# Patient Record
Sex: MALE | Race: WHITE | NOT HISPANIC OR LATINO
[De-identification: names, ages, dates, MRNs, and addresses within clinical notes are randomized per-mention and may not be internally consistent; named-entity substitution may affect disease eponyms.]

---

## 2020-02-25 ENCOUNTER — HOSPITAL (OUTPATIENT)
Dept: OTHER | Age: 50
End: 2020-02-25
Attending: ORTHOPAEDIC SURGERY

## 2020-02-27 ENCOUNTER — HOSPITAL (OUTPATIENT)
Dept: OTHER | Age: 50
End: 2020-02-27
Attending: ORTHOPAEDIC SURGERY

## 2020-10-02 ENCOUNTER — HOSPITAL (OUTPATIENT)
Dept: OTHER | Age: 50
End: 2020-10-02
Attending: PHYSICIAN ASSISTANT

## 2020-11-14 ENCOUNTER — HOSPITAL (OUTPATIENT)
Dept: OTHER | Age: 50
End: 2020-11-14

## 2020-11-17 LAB
SARS-COV-2 RNA RESP QL NAA+PROBE: NOT DETECTED
SPECIMEN SOURCE: NORMAL

## 2021-02-09 ENCOUNTER — TELEPHONE (OUTPATIENT)
Dept: MRI IMAGING | Age: 51
End: 2021-02-09

## 2021-02-11 ENCOUNTER — TELEPHONE (OUTPATIENT)
Dept: CT IMAGING | Age: 51
End: 2021-02-11

## 2021-02-15 ENCOUNTER — TELEPHONE (OUTPATIENT)
Dept: PREADMISSION TESTING | Age: 51
End: 2021-02-15

## 2021-02-15 VITALS — HEIGHT: 75 IN | WEIGHT: 290 LBS | BODY MASS INDEX: 36.06 KG/M2

## 2021-02-15 DIAGNOSIS — Z01.812 PRE-PROCEDURAL LABORATORY EXAMINATION: Primary | ICD-10-CM

## 2021-02-15 ASSESSMENT — ACTIVITIES OF DAILY LIVING (ADL)
HISTORY OF FALLING IN THE LAST YEAR (PRIOR TO ADMISSION): NO
ADL_BEFORE_ADMISSION: INDEPENDENT
ADL_SCORE: 12
NEEDS_ASSIST: NO

## 2021-02-15 ASSESSMENT — COGNITIVE AND FUNCTIONAL STATUS - GENERAL
ARE YOU BLIND OR DO YOU HAVE SERIOUS DIFFICULTY SEEING, EVEN WHEN WEARING GLASSES: NO
ARE YOU DEAF OR DO YOU HAVE SERIOUS DIFFICULTY  HEARING: NO

## 2021-02-17 ENCOUNTER — LAB SERVICES (OUTPATIENT)
Dept: LAB | Age: 51
End: 2021-02-17

## 2021-02-17 DIAGNOSIS — Z01.812 PRE-PROCEDURAL LABORATORY EXAMINATION: ICD-10-CM

## 2021-02-17 PROCEDURE — U0003 INFECTIOUS AGENT DETECTION BY NUCLEIC ACID (DNA OR RNA); SEVERE ACUTE RESPIRATORY SYNDROME CORONAVIRUS 2 (SARS-COV-2) (CORONAVIRUS DISEASE [COVID-19]), AMPLIFIED PROBE TECHNIQUE, MAKING USE OF HIGH THROUGHPUT TECHNOLOGIES AS DESCRIBED BY CMS-2020-01-R: HCPCS

## 2021-02-18 ENCOUNTER — ANESTHESIA EVENT (OUTPATIENT)
Dept: MRI IMAGING | Age: 51
End: 2021-02-18

## 2021-02-18 LAB
SARS-COV-2 RNA RESP QL NAA+PROBE: NOT DETECTED
SERVICE CMNT-IMP: NORMAL
SERVICE CMNT-IMP: NORMAL

## 2021-02-19 ENCOUNTER — ANESTHESIA (OUTPATIENT)
Dept: MRI IMAGING | Age: 51
End: 2021-02-19

## 2021-02-19 ENCOUNTER — HOSPITAL ENCOUNTER (OUTPATIENT)
Dept: MRI IMAGING | Age: 51
Discharge: HOME | End: 2021-02-19

## 2021-02-19 ENCOUNTER — HOSPITAL ENCOUNTER (OUTPATIENT)
Dept: GENERAL RADIOLOGY | Age: 51
Discharge: HOME | End: 2021-02-19
Attending: NURSE ANESTHETIST, CERTIFIED REGISTERED

## 2021-02-19 VITALS
WEIGHT: 297.62 LBS | RESPIRATION RATE: 13 BRPM | DIASTOLIC BLOOD PRESSURE: 100 MMHG | SYSTOLIC BLOOD PRESSURE: 144 MMHG | HEART RATE: 97 BPM | HEIGHT: 75 IN | BODY MASS INDEX: 37.01 KG/M2 | TEMPERATURE: 97.6 F | OXYGEN SATURATION: 94 %

## 2021-02-19 DIAGNOSIS — M79.602 PAIN, ARM, LEFT: ICD-10-CM

## 2021-02-19 DIAGNOSIS — R20.2 PARESTHESIA AND PAIN OF BOTH UPPER EXTREMITIES: ICD-10-CM

## 2021-02-19 DIAGNOSIS — M79.602 PARESTHESIA AND PAIN OF BOTH UPPER EXTREMITIES: ICD-10-CM

## 2021-02-19 DIAGNOSIS — R20.2 PARESTHESIA OF BOTH LOWER EXTREMITIES: ICD-10-CM

## 2021-02-19 DIAGNOSIS — M79.601 PARESTHESIA AND PAIN OF BOTH UPPER EXTREMITIES: ICD-10-CM

## 2021-02-19 PROCEDURE — 10002801 HB RX 250 W/O HCPCS: Performed by: NURSE ANESTHETIST, CERTIFIED REGISTERED

## 2021-02-19 PROCEDURE — 71045 X-RAY EXAM CHEST 1 VIEW: CPT

## 2021-02-19 PROCEDURE — 70551 MRI BRAIN STEM W/O DYE: CPT

## 2021-02-19 PROCEDURE — 13000008 HB ANESTHESIA MAC OUTSIDE OR

## 2021-02-19 PROCEDURE — 10002800 HB RX 250 W HCPCS: Performed by: NURSE ANESTHETIST, CERTIFIED REGISTERED

## 2021-02-19 PROCEDURE — 13000001 HB PHASE II RECOVERY EA 30 MINUTES

## 2021-02-19 PROCEDURE — 10004651 HB RX, NO CHARGE ITEM: Performed by: ANESTHESIOLOGY

## 2021-02-19 PROCEDURE — 10002807 HB RX 258: Performed by: REGISTERED NURSE

## 2021-02-19 RX ORDER — MIDAZOLAM HYDROCHLORIDE 1 MG/ML
INJECTION, SOLUTION INTRAMUSCULAR; INTRAVENOUS PRN
Status: DISCONTINUED | OUTPATIENT
Start: 2021-02-19 | End: 2021-02-19

## 2021-02-19 RX ORDER — ACETAMINOPHEN 325 MG/1
TABLET ORAL
Status: DISPENSED
Start: 2021-02-19 | End: 2021-02-20

## 2021-02-19 RX ORDER — DEXTROSE MONOHYDRATE 25 G/50ML
25 INJECTION, SOLUTION INTRAVENOUS PRN
Status: DISCONTINUED | OUTPATIENT
Start: 2021-02-19 | End: 2021-02-19 | Stop reason: HOSPADM

## 2021-02-19 RX ORDER — IPRATROPIUM BROMIDE AND ALBUTEROL SULFATE 2.5; .5 MG/3ML; MG/3ML
3 SOLUTION RESPIRATORY (INHALATION) ONCE
Status: COMPLETED | OUTPATIENT
Start: 2021-02-19 | End: 2021-02-19

## 2021-02-19 RX ORDER — HUMAN INSULIN 100 [IU]/ML
INJECTION, SOLUTION SUBCUTANEOUS
Status: DISCONTINUED | OUTPATIENT
Start: 2021-02-19 | End: 2021-02-19 | Stop reason: HOSPADM

## 2021-02-19 RX ORDER — 0.9 % SODIUM CHLORIDE 0.9 %
2 VIAL (ML) INJECTION EVERY 12 HOURS SCHEDULED
Status: DISCONTINUED | OUTPATIENT
Start: 2021-02-19 | End: 2021-02-19 | Stop reason: HOSPADM

## 2021-02-19 RX ORDER — SODIUM CHLORIDE, SODIUM LACTATE, POTASSIUM CHLORIDE, CALCIUM CHLORIDE 600; 310; 30; 20 MG/100ML; MG/100ML; MG/100ML; MG/100ML
INJECTION, SOLUTION INTRAVENOUS CONTINUOUS
Status: DISCONTINUED | OUTPATIENT
Start: 2021-02-19 | End: 2021-02-19 | Stop reason: HOSPADM

## 2021-02-19 RX ORDER — ACETAMINOPHEN 325 MG/1
650 TABLET ORAL EVERY 4 HOURS PRN
Status: DISCONTINUED | OUTPATIENT
Start: 2021-02-19 | End: 2021-02-21 | Stop reason: HOSPADM

## 2021-02-19 RX ORDER — IPRATROPIUM BROMIDE AND ALBUTEROL SULFATE 2.5; .5 MG/3ML; MG/3ML
SOLUTION RESPIRATORY (INHALATION)
Status: DISPENSED
Start: 2021-02-19 | End: 2021-02-20

## 2021-02-19 RX ORDER — PROPOFOL 10 MG/ML
INJECTION, EMULSION INTRAVENOUS PRN
Status: DISCONTINUED | OUTPATIENT
Start: 2021-02-19 | End: 2021-02-19

## 2021-02-19 RX ORDER — ONDANSETRON 2 MG/ML
4 INJECTION INTRAMUSCULAR; INTRAVENOUS 2 TIMES DAILY PRN
Status: DISCONTINUED | OUTPATIENT
Start: 2021-02-19 | End: 2021-02-21 | Stop reason: HOSPADM

## 2021-02-19 RX ORDER — DIPHENHYDRAMINE HYDROCHLORIDE 50 MG/ML
25 INJECTION INTRAMUSCULAR; INTRAVENOUS
Status: DISCONTINUED | OUTPATIENT
Start: 2021-02-19 | End: 2021-02-21 | Stop reason: HOSPADM

## 2021-02-19 RX ORDER — NICOTINE POLACRILEX 4 MG
30 LOZENGE BUCCAL
Status: DISCONTINUED | OUTPATIENT
Start: 2021-02-19 | End: 2021-02-19 | Stop reason: HOSPADM

## 2021-02-19 RX ORDER — LIDOCAINE HYDROCHLORIDE 20 MG/ML
INJECTION, SOLUTION INFILTRATION; PERINEURAL PRN
Status: DISCONTINUED | OUTPATIENT
Start: 2021-02-19 | End: 2021-02-19

## 2021-02-19 RX ORDER — DEXTROSE MONOHYDRATE 50 MG/ML
INJECTION, SOLUTION INTRAVENOUS CONTINUOUS PRN
Status: DISCONTINUED | OUTPATIENT
Start: 2021-02-19 | End: 2021-02-21 | Stop reason: HOSPADM

## 2021-02-19 RX ORDER — LIDOCAINE HYDROCHLORIDE 10 MG/ML
5-10 INJECTION, SOLUTION INFILTRATION; PERINEURAL PRN
Status: DISCONTINUED | OUTPATIENT
Start: 2021-02-19 | End: 2021-02-19 | Stop reason: HOSPADM

## 2021-02-19 RX ORDER — SODIUM CHLORIDE 9 MG/ML
INJECTION, SOLUTION INTRAVENOUS CONTINUOUS
Status: DISCONTINUED | OUTPATIENT
Start: 2021-02-19 | End: 2021-02-19 | Stop reason: HOSPADM

## 2021-02-19 RX ADMIN — ACETAMINOPHEN 325 MG: 325 TABLET ORAL at 17:02

## 2021-02-19 RX ADMIN — IPRATROPIUM BROMIDE AND ALBUTEROL SULFATE 3 ML: 2.5; .5 SOLUTION RESPIRATORY (INHALATION) at 16:23

## 2021-02-19 RX ADMIN — MIDAZOLAM HYDROCHLORIDE 3 MG: 1 INJECTION, SOLUTION INTRAMUSCULAR; INTRAVENOUS at 14:08

## 2021-02-19 RX ADMIN — SODIUM CHLORIDE, SODIUM LACTATE, POTASSIUM CHLORIDE, AND CALCIUM CHLORIDE: .6; .31; .03; .02 INJECTION, SOLUTION INTRAVENOUS at 12:40

## 2021-02-19 RX ADMIN — PROPOFOL 120 MCG/KG/MIN: 10 INJECTION, EMULSION INTRAVENOUS at 14:14

## 2021-02-19 RX ADMIN — PROPOFOL 30 MG: 10 INJECTION, EMULSION INTRAVENOUS at 14:14

## 2021-02-19 RX ADMIN — LIDOCAINE HYDROCHLORIDE 5 ML: 20 INJECTION, SOLUTION INFILTRATION; PERINEURAL at 14:14

## 2021-02-19 SDOH — SOCIAL STABILITY: SOCIAL INSECURITY: RISK FACTORS: SLEEP APNEA

## 2021-02-19 SDOH — SOCIAL STABILITY: SOCIAL INSECURITY: RISK FACTORS: BMI> 30 (OBESITY)

## 2021-02-19 ASSESSMENT — PAIN SCALES - GENERAL
PAINLEVEL_OUTOF10: 0
PAINLEVEL_OUTOF10: 0
PAINLEVEL_OUTOF10: 2
PAINLEVEL_OUTOF10: 0
PAINLEVEL_OUTOF10: 4
PAINLEVEL_OUTOF10: 2
PAINLEVEL_OUTOF10: 0

## 2021-02-19 ASSESSMENT — ENCOUNTER SYMPTOMS: EXERCISE TOLERANCE: GOOD (>4 METS)

## 2021-04-12 ENCOUNTER — HOSPITAL ENCOUNTER (OUTPATIENT)
Dept: ULTRASOUND IMAGING | Age: 51
Discharge: HOME | End: 2021-04-12
Attending: INTERNAL MEDICINE

## 2021-04-12 DIAGNOSIS — E04.2 NONTOXIC MULTINODULAR GOITER: ICD-10-CM

## 2021-04-12 PROCEDURE — 76536 US EXAM OF HEAD AND NECK: CPT

## 2022-07-21 ENCOUNTER — OFFICE VISIT (OUTPATIENT)
Dept: INTEGRATIVE MEDICINE | Facility: CLINIC | Age: 52
End: 2022-07-21
Payer: COMMERCIAL

## 2022-07-21 VITALS
OXYGEN SATURATION: 97 % | SYSTOLIC BLOOD PRESSURE: 122 MMHG | BODY MASS INDEX: 34.54 KG/M2 | HEART RATE: 51 BPM | HEIGHT: 75 IN | DIASTOLIC BLOOD PRESSURE: 84 MMHG | WEIGHT: 277.81 LBS

## 2022-07-21 DIAGNOSIS — E61.7 DEFICIENCY OF MULTIPLE NUTRIENT ELEMENTS: ICD-10-CM

## 2022-07-21 DIAGNOSIS — R79.89 ELEVATED HOMOCYSTEINE: Primary | ICD-10-CM

## 2022-07-21 DIAGNOSIS — E55.9 VITAMIN D DEFICIENCY: ICD-10-CM

## 2022-07-21 DIAGNOSIS — M1A.9XX0 CHRONIC GOUT INVOLVING TOE OF LEFT FOOT WITHOUT TOPHUS, UNSPECIFIED CAUSE: ICD-10-CM

## 2022-07-21 DIAGNOSIS — M54.2 NECK PAIN: ICD-10-CM

## 2022-07-21 PROCEDURE — 99205 OFFICE O/P NEW HI 60 MIN: CPT | Performed by: FAMILY MEDICINE

## 2022-07-21 PROCEDURE — 3074F SYST BP LT 130 MM HG: CPT | Performed by: FAMILY MEDICINE

## 2022-07-21 PROCEDURE — 3079F DIAST BP 80-89 MM HG: CPT | Performed by: FAMILY MEDICINE

## 2022-07-21 PROCEDURE — 3008F BODY MASS INDEX DOCD: CPT | Performed by: FAMILY MEDICINE

## 2022-07-21 RX ORDER — INDOMETHACIN 50 MG/1
1 CAPSULE ORAL
COMMUNITY
Start: 2022-04-28

## 2022-07-21 RX ORDER — DIAZEPAM 5 MG/1
TABLET ORAL
COMMUNITY
Start: 2021-08-10

## 2022-07-21 RX ORDER — COLCHICINE 0.6 MG/1
0.6 TABLET ORAL DAILY
COMMUNITY
Start: 2022-05-09 | End: 2022-08-07

## 2022-07-21 RX ORDER — ERGOCALCIFEROL (VITAMIN D2) 1250 MCG
50000 CAPSULE ORAL
COMMUNITY
Start: 2022-05-09 | End: 2022-08-01

## 2022-08-06 ENCOUNTER — WALK IN (OUTPATIENT)
Dept: URGENT CARE | Age: 52
End: 2022-08-06
Attending: EMERGENCY MEDICINE

## 2022-08-06 VITALS
RESPIRATION RATE: 16 BRPM | TEMPERATURE: 97.4 F | HEART RATE: 65 BPM | SYSTOLIC BLOOD PRESSURE: 131 MMHG | OXYGEN SATURATION: 97 % | DIASTOLIC BLOOD PRESSURE: 85 MMHG

## 2022-08-06 DIAGNOSIS — H60.331 ACUTE SWIMMER'S EAR OF RIGHT SIDE: ICD-10-CM

## 2022-08-06 DIAGNOSIS — H66.002 NON-RECURRENT ACUTE SUPPURATIVE OTITIS MEDIA OF LEFT EAR WITHOUT SPONTANEOUS RUPTURE OF TYMPANIC MEMBRANE: Primary | ICD-10-CM

## 2022-08-06 PROCEDURE — 99213 OFFICE O/P EST LOW 20 MIN: CPT

## 2022-08-06 RX ORDER — CIPROFLOXACIN AND DEXAMETHASONE 3; 1 MG/ML; MG/ML
4 SUSPENSION/ DROPS AURICULAR (OTIC) 2 TIMES DAILY
Qty: 7.5 ML | Refills: 0 | Status: SHIPPED | OUTPATIENT
Start: 2022-08-06 | End: 2022-08-13

## 2022-08-06 RX ORDER — ALLOPURINOL 100 MG/1
TABLET ORAL
COMMUNITY
Start: 2020-09-10

## 2022-08-06 RX ORDER — AMOXICILLIN 875 MG/1
875 TABLET, COATED ORAL 2 TIMES DAILY
Qty: 20 TABLET | Refills: 0 | Status: SHIPPED | OUTPATIENT
Start: 2022-08-06 | End: 2022-08-16

## 2022-08-06 ASSESSMENT — ENCOUNTER SYMPTOMS
VOMITING: 0
CHEST TIGHTNESS: 0
SINUS PRESSURE: 0
SHORTNESS OF BREATH: 0
SORE THROAT: 0
NAUSEA: 0
PHOTOPHOBIA: 0
CHILLS: 0
FEVER: 0
EYE DISCHARGE: 0
ADENOPATHY: 0
SINUS PAIN: 0
WHEEZING: 0
CONSTIPATION: 0
EYE ITCHING: 0
VISUAL CHANGE: 0
EYE PAIN: 0
COUGH: 0
NUMBNESS: 0
ABDOMINAL DISTENTION: 0
DIARRHEA: 0
SWOLLEN GLANDS: 0
WOUND: 0
WEAKNESS: 0
ANOREXIA: 0
EYE REDNESS: 0
ABDOMINAL PAIN: 0
COLOR CHANGE: 0
CHANGE IN BOWEL HABIT: 0
VERTIGO: 0
FATIGUE: 0
DIAPHORESIS: 0
TROUBLE SWALLOWING: 0
RHINORRHEA: 0
HEADACHES: 0

## 2022-08-07 LAB
ALBUMIN/GLOBULIN RATIO: 2 (CALC) (ref 1–2.5)
ALBUMIN: 4.4 G/DL (ref 3.6–5.1)
ALKALINE PHOSPHATASE: 52 U/L (ref 35–144)
ALT: 18 U/L (ref 9–46)
AST: 16 U/L (ref 10–35)
BILIRUBIN, TOTAL: 1 MG/DL (ref 0.2–1.2)
BUN: 15 MG/DL (ref 7–25)
CALCIUM: 9.3 MG/DL (ref 8.6–10.3)
CARBON DIOXIDE: 30 MMOL/L (ref 20–32)
CARDIO CRP(R): 4.1 MG/L
CHLORIDE: 104 MMOL/L (ref 98–110)
CREATININE, RANDOM URINE: 96 MG/DL (ref 20–320)
CREATININE: 1.12 MG/DL (ref 0.7–1.3)
EGFR: 79 ML/MIN/1.73M2
FOLATE, SERUM: 19 NG/ML
GLOBULIN: 2.2 G/DL (CALC) (ref 1.9–3.7)
GLUCOSE: 95 MG/DL (ref 65–99)
HOMOCYSTEINE,$CARDIOVASCULAR: 9.9 UMOL/L
INSULIN: 9.3 UIU/ML
POTASSIUM: 3.9 MMOL/L (ref 3.5–5.3)
PROTEIN, TOTAL: 6.6 G/DL (ref 6.1–8.1)
SODIUM: 141 MMOL/L (ref 135–146)
URIC ACID, RANDOM URINE: 26 MG/DL
URIC ACID: 6.3 MG/DL (ref 4–8)
VITAMIN B12: 435 PG/ML (ref 200–1100)
VITAMIN D, 25-OH, D2: 19 NG/ML
VITAMIN D, 25-OH, D3: 22 NG/ML
VITAMIN D, 25-OH, TOTAL: 41 NG/ML (ref 30–100)

## 2022-08-23 ENCOUNTER — TELEPHONE (OUTPATIENT)
Dept: INTEGRATIVE MEDICINE | Facility: CLINIC | Age: 52
End: 2022-08-23

## 2022-08-23 DIAGNOSIS — E55.9 VITAMIN D DEFICIENCY: ICD-10-CM

## 2022-08-23 DIAGNOSIS — E34.9 TESTOSTERONE INSUFFICIENCY: Primary | ICD-10-CM

## 2022-08-23 NOTE — TELEPHONE ENCOUNTER
Pt stated he has been feeling light headed and dizzy since he started Testosterone. Requested to speak with a nurse. Luiza Maher

## 2022-08-23 NOTE — TELEPHONE ENCOUNTER
Returned call to patient c/o dizziness since starting the testosterone 50 mcg/daily patches on Friday. Patient relates symptoms beginning in first day of use, Friday 8/20 and each subsequent day whether he was exercising or relaxing. Lightheadedness and then dizziness within 2 hrs of applying testosterone gel to his shoulders. When feeling these symptoms and he sits/ rests they seem to go away and doesn't experience until he reapplies gel again the next day. Today however he felt the vertigo start and felt his breathing was \"different\", not quite what he would term SOB, but different than normal.  So he came home and showered to ensure no gel on his skin. States 1 hrs after shower and call to this office, he feels better/ symptoms gone. Pt denies headache, SOB, blurred vision or other symptoms. No extremity or facial numbness or tingling. States he is not taking any new medications or supplements. Has started low carb diet recently (before testosterone Rx), but no new med/ food ingested or other environmental exposure. Pt has F/U virtual appt with Dr Mayank Olivarez early morning 8/25/22. Advised patient not to use the testosterone gel between now and his 8/25/22 appt with Dr Mayank Olivarez and to monitor B/P and symptoms of increasing SOB and vertigo which will require him to seek evaluation at [facility] or ED.

## 2022-08-25 ENCOUNTER — TELEMEDICINE (OUTPATIENT)
Dept: INTEGRATIVE MEDICINE | Facility: CLINIC | Age: 52
End: 2022-08-25

## 2022-08-25 DIAGNOSIS — M54.2 NECK PAIN: ICD-10-CM

## 2022-08-25 DIAGNOSIS — M1A.9XX0 CHRONIC GOUT INVOLVING TOE OF LEFT FOOT WITHOUT TOPHUS, UNSPECIFIED CAUSE: ICD-10-CM

## 2022-08-25 DIAGNOSIS — E34.9 TESTOSTERONE INSUFFICIENCY: Primary | ICD-10-CM

## 2022-08-25 DIAGNOSIS — R79.89 ELEVATED HOMOCYSTEINE: ICD-10-CM

## 2022-08-25 RX ORDER — FLASH GLUCOSE SENSOR
1 KIT MISCELLANEOUS
Qty: 2 EACH | Refills: 0 | Status: SHIPPED | OUTPATIENT
Start: 2022-08-25

## 2022-08-25 RX ORDER — FLASH GLUCOSE SCANNING READER
1 EACH MISCELLANEOUS ONCE
Qty: 1 EACH | Refills: 0 | Status: SHIPPED | OUTPATIENT
Start: 2022-08-25 | End: 2022-08-25

## 2022-09-06 ENCOUNTER — PATIENT MESSAGE (OUTPATIENT)
Dept: INTEGRATIVE MEDICINE | Facility: CLINIC | Age: 52
End: 2022-09-06

## 2022-09-06 DIAGNOSIS — R73.01 ELEVATED FASTING BLOOD SUGAR: Primary | ICD-10-CM

## 2022-09-06 NOTE — TELEPHONE ENCOUNTER
Please advise patient:    It looks like hypoglycemia may be causing his sleep dysfunction and possibly symptoms of tingling. Lets finish the 2 weeks recording to find pattern. I added a hemoglobin A1c order. This will help us ensure that he is not trending towards prediabetes given his higher and fasting blood sugar is 110. May attempt to use a small protein-based snack before bed or a handful of raw almonds or cashews to see if this helps with blood sugar drops.

## 2022-09-06 NOTE — TELEPHONE ENCOUNTER
From: Ananth Strickland  To: Paul Hawk,   Sent: 9/6/2022 8:56 AM CDT  Subject: Janet Gentleman readings    Dr Lois Pickard,  Began Freestyle Josefa Sunday AM. Last 2 nights I have had readings drop to / below 50 mg/dl. Horrible night of sleep last night, even with gabapentin. Tingling erupts when I dont sleep well. Readings during day between  depending on eating. .. Talked about results with a usman of mine who has diabetes. He wanted me to reach out to you ASAP versus waiting for full 2 weeks for FstylLibre. I have ability to send you results, but need code for you? We may have finally found my sleep issue. Please advise on next steps.      Thanks  Davion Patterson

## 2022-09-16 LAB
BUN: 23 MG/DL (ref 7–25)
CALCIUM: 9.9 MG/DL (ref 8.6–10.3)
CARBON DIOXIDE: 31 MMOL/L (ref 20–32)
CHLORIDE: 102 MMOL/L (ref 98–110)
CREATININE: 1.21 MG/DL (ref 0.7–1.3)
EGFR: 72 ML/MIN/1.73M2
FREE TESTOSTERONE: 154.3 PG/ML (ref 35–155)
GLUCOSE: 85 MG/DL (ref 65–99)
HEMOGLOBIN A1C: 4.9 % OF TOTAL HGB
POTASSIUM: 4.3 MMOL/L (ref 3.5–5.3)
SODIUM: 140 MMOL/L (ref 135–146)
TESTOSTERONE, TOTAL,$/MS/MS: 742 NG/DL (ref 250–1100)
VITAMIN D, 25-OH, TOTAL: 67 NG/ML (ref 30–100)

## 2022-10-24 RX ORDER — TESTOSTERONE GEL, 1% 10 MG/G
50 GEL TRANSDERMAL DAILY
Qty: 150 G | Refills: 1 | Status: SHIPPED | OUTPATIENT
Start: 2022-10-24

## 2022-10-26 ENCOUNTER — OFFICE VISIT (OUTPATIENT)
Dept: INTEGRATIVE MEDICINE | Facility: CLINIC | Age: 52
End: 2022-10-26
Payer: COMMERCIAL

## 2022-10-26 VITALS
SYSTOLIC BLOOD PRESSURE: 125 MMHG | HEART RATE: 68 BPM | WEIGHT: 279 LBS | DIASTOLIC BLOOD PRESSURE: 80 MMHG | OXYGEN SATURATION: 95 % | BODY MASS INDEX: 35 KG/M2

## 2022-10-26 DIAGNOSIS — F43.0 STRESS REACTION: ICD-10-CM

## 2022-10-26 DIAGNOSIS — R79.89 ELEVATED HOMOCYSTEINE: ICD-10-CM

## 2022-10-26 DIAGNOSIS — M1A.9XX0 CHRONIC GOUT INVOLVING TOE OF LEFT FOOT WITHOUT TOPHUS, UNSPECIFIED CAUSE: ICD-10-CM

## 2022-10-26 DIAGNOSIS — M79.674 GREAT TOE PAIN, RIGHT: ICD-10-CM

## 2022-10-26 DIAGNOSIS — E34.9 TESTOSTERONE INSUFFICIENCY: Primary | ICD-10-CM

## 2022-10-26 PROCEDURE — 3079F DIAST BP 80-89 MM HG: CPT | Performed by: FAMILY MEDICINE

## 2022-10-26 PROCEDURE — 3074F SYST BP LT 130 MM HG: CPT | Performed by: FAMILY MEDICINE

## 2022-10-26 PROCEDURE — 99214 OFFICE O/P EST MOD 30 MIN: CPT | Performed by: FAMILY MEDICINE

## 2022-10-26 RX ORDER — ALPRAZOLAM 0.5 MG/1
0.5 TABLET, ORALLY DISINTEGRATING ORAL DAILY PRN
Qty: 10 TABLET | Refills: 0 | Status: SHIPPED | OUTPATIENT
Start: 2022-10-26

## 2022-10-28 ENCOUNTER — TELEPHONE (OUTPATIENT)
Dept: INTEGRATIVE MEDICINE | Facility: CLINIC | Age: 52
End: 2022-10-28

## 2022-10-28 NOTE — TELEPHONE ENCOUNTER
Chandler Arriola from [employer] called for clarification, Chandler Arriola is asking if ALPRAZolam was meant to be ODT or can give regular Rx?

## 2022-11-02 NOTE — TELEPHONE ENCOUNTER
Message routed to Dr. Linsey Heaton for review and recommendations. Did you want oral dissolving tab or regular PO tab?

## 2022-11-03 NOTE — TELEPHONE ENCOUNTER
Regular PO tablet is fine. It does not need to be ODT. Please call pharmacy to cancel initial order and I can send the tablet if unable to authorize verbally.

## 2022-11-04 RX ORDER — ALPRAZOLAM 0.5 MG/1
0.5 TABLET ORAL
Qty: 10 TABLET | Refills: 0 | Status: CANCELLED | OUTPATIENT
Start: 2022-11-04

## 2022-11-04 NOTE — TELEPHONE ENCOUNTER
I spoke with pharmacist, clarified xanax rx for po tabs. Not dispersible tabs. Routed to Dr. Joie Holt to sign off. Rx already called in.

## 2022-11-22 ENCOUNTER — TELEPHONE (OUTPATIENT)
Dept: INTEGRATIVE MEDICINE | Facility: CLINIC | Age: 52
End: 2022-11-22

## 2022-11-22 NOTE — TELEPHONE ENCOUNTER
Please contact patient to discuss process for receiving labs on [facility] portal. He appeared very upset stating \"the health care system is broken\". Also mailed labs to patient's home mailing address since he said when they are faxed to the [employer] [facility] this causes issues. Patient requesting the staff to please reference other messages regarding labs being sent to [facility] sucessfully.      Please advise, Thank you

## 2022-11-22 NOTE — TELEPHONE ENCOUNTER
[facility] changed to [facility] - please let pt know he can schedule labs - they should be able to see them now.

## 2022-11-28 LAB
ABSOLUTE BASOPHILS: 31 CELLS/UL (ref 0–200)
ABSOLUTE EOSINOPHILS: 62 CELLS/UL (ref 15–500)
ABSOLUTE LYMPHOCYTES: 1971 CELLS/UL (ref 850–3900)
ABSOLUTE MONOCYTES: 624 CELLS/UL (ref 200–950)
ABSOLUTE NEUTROPHILS: 5013 CELLS/UL (ref 1500–7800)
ALBUMIN/GLOBULIN RATIO: 1.9 (CALC) (ref 1–2.5)
ALBUMIN: 4.7 G/DL (ref 3.6–5.1)
ALKALINE PHOSPHATASE: 58 U/L (ref 35–144)
ALT: 24 U/L (ref 9–46)
AST: 20 U/L (ref 10–35)
BASOPHILS: 0.4 %
BILIRUBIN, TOTAL: 0.9 MG/DL (ref 0.2–1.2)
BUN: 24 MG/DL (ref 7–25)
CALCIUM: 9.9 MG/DL (ref 8.6–10.3)
CARBON DIOXIDE: 26 MMOL/L (ref 20–32)
CHLORIDE: 102 MMOL/L (ref 98–110)
CREATININE: 1.24 MG/DL (ref 0.7–1.3)
EGFR: 69 ML/MIN/1.73M2
EOSINOPHILS: 0.8 %
ESTRADIOL: 36 PG/ML
FREE TESTOSTERONE: 155.1 PG/ML (ref 35–155)
GLOBULIN: 2.5 G/DL (CALC) (ref 1.9–3.7)
GLUCOSE: 106 MG/DL (ref 65–139)
HEMATOCRIT: 50.2 % (ref 38.5–50)
HEMOGLOBIN: 17.3 G/DL (ref 13.2–17.1)
LYMPHOCYTES: 25.6 %
MCH: 31 PG (ref 27–33)
MCHC: 34.5 G/DL (ref 32–36)
MCV: 90 FL (ref 80–100)
MONOCYTES: 8.1 %
MPV: 9.6 FL (ref 7.5–12.5)
NEUTROPHILS: 65.1 %
PLATELET COUNT: 207 THOUSAND/UL (ref 140–400)
POTASSIUM: 4.2 MMOL/L (ref 3.5–5.3)
PROTEIN, TOTAL: 7.2 G/DL (ref 6.1–8.1)
PSA, TOTAL: 1.02 NG/ML
RDW: 12.7 % (ref 11–15)
RED BLOOD CELL COUNT: 5.58 MILLION/UL (ref 4.2–5.8)
SODIUM: 140 MMOL/L (ref 135–146)
TESTOSTERONE, TOTAL,$/MS/MS: 653 NG/DL (ref 250–1100)
WHITE BLOOD CELL COUNT: 7.7 THOUSAND/UL (ref 3.8–10.8)

## 2022-11-29 ENCOUNTER — TELEPHONE (OUTPATIENT)
Dept: INTEGRATIVE MEDICINE | Facility: CLINIC | Age: 52
End: 2022-11-29

## 2022-11-29 ENCOUNTER — PATIENT MESSAGE (OUTPATIENT)
Dept: INTEGRATIVE MEDICINE | Facility: CLINIC | Age: 52
End: 2022-11-29

## 2022-11-29 NOTE — TELEPHONE ENCOUNTER
Spoke with pharmacy -  20.25 mg/actuation. 2 acutuation testosterone cream daily. Discussed with patient. Notes no side effects at this time. No LUTS symptoms, nipple sensitivity, or irritability. Will use testosterone cream x1 month then refill for 90 days.

## 2022-12-09 ENCOUNTER — MED REC SCAN ONLY (OUTPATIENT)
Dept: INTEGRATIVE MEDICINE | Facility: CLINIC | Age: 52
End: 2022-12-09

## 2022-12-23 RX ORDER — TESTOSTERONE 20.25 MG/1.25G
GEL TOPICAL
Qty: 75 G | Refills: 0 | Status: SHIPPED | OUTPATIENT
Start: 2022-12-23

## 2023-02-01 ENCOUNTER — OFFICE VISIT (OUTPATIENT)
Dept: INTEGRATIVE MEDICINE | Facility: CLINIC | Age: 53
End: 2023-02-01
Payer: COMMERCIAL

## 2023-02-01 VITALS
DIASTOLIC BLOOD PRESSURE: 88 MMHG | SYSTOLIC BLOOD PRESSURE: 120 MMHG | HEART RATE: 62 BPM | BODY MASS INDEX: 35 KG/M2 | OXYGEN SATURATION: 98 % | WEIGHT: 278.81 LBS

## 2023-02-01 DIAGNOSIS — R79.89 ELEVATED HOMOCYSTEINE: ICD-10-CM

## 2023-02-01 DIAGNOSIS — R00.2 PALPITATIONS: Primary | ICD-10-CM

## 2023-02-01 DIAGNOSIS — F43.0 STRESS REACTION: ICD-10-CM

## 2023-02-01 DIAGNOSIS — M1A.9XX0 CHRONIC GOUT INVOLVING TOE OF LEFT FOOT WITHOUT TOPHUS, UNSPECIFIED CAUSE: ICD-10-CM

## 2023-02-01 DIAGNOSIS — E34.9 TESTOSTERONE INSUFFICIENCY: ICD-10-CM

## 2023-02-01 DIAGNOSIS — R73.01 ELEVATED FASTING BLOOD SUGAR: ICD-10-CM

## 2023-02-01 DIAGNOSIS — R06.09 DYSPNEA ON EXERTION: ICD-10-CM

## 2023-02-01 DIAGNOSIS — E55.9 VITAMIN D DEFICIENCY: ICD-10-CM

## 2023-02-01 DIAGNOSIS — E61.7 DEFICIENCY OF MULTIPLE NUTRIENT ELEMENTS: ICD-10-CM

## 2023-02-01 PROCEDURE — 3079F DIAST BP 80-89 MM HG: CPT | Performed by: FAMILY MEDICINE

## 2023-02-01 PROCEDURE — 3074F SYST BP LT 130 MM HG: CPT | Performed by: FAMILY MEDICINE

## 2023-02-01 PROCEDURE — 99214 OFFICE O/P EST MOD 30 MIN: CPT | Performed by: FAMILY MEDICINE

## 2023-02-01 RX ORDER — TESTOSTERONE 20.25 MG/1.25G
GEL TOPICAL
COMMUNITY

## 2023-02-11 LAB
ABSOLUTE BASOPHILS: 22 CELLS/UL (ref 0–200)
ABSOLUTE EOSINOPHILS: 78 CELLS/UL (ref 15–500)
ABSOLUTE LYMPHOCYTES: 1400 CELLS/UL (ref 850–3900)
ABSOLUTE MONOCYTES: 392 CELLS/UL (ref 200–950)
ABSOLUTE NEUTROPHILS: 3707 CELLS/UL (ref 1500–7800)
ALBUMIN/GLOBULIN RATIO: 2 (CALC) (ref 1–2.5)
ALBUMIN: 4.6 G/DL (ref 3.6–5.1)
ALKALINE PHOSPHATASE: 54 U/L (ref 35–144)
ALT: 32 U/L (ref 9–46)
AST: 32 U/L (ref 10–35)
BASOPHILS: 0.4 %
BILIRUBIN, TOTAL: 1.1 MG/DL (ref 0.2–1.2)
BUN: 23 MG/DL (ref 7–25)
CALCIUM: 10 MG/DL (ref 8.6–10.3)
CARBON DIOXIDE: 30 MMOL/L (ref 20–32)
CARDIO CRP(R): 1.9 MG/L
CHLORIDE: 104 MMOL/L (ref 98–110)
CHOL/HDLC RATIO: 4.5 (CALC)
CHOLESTEROL, TOTAL: 162 MG/DL
CREATININE: 1.08 MG/DL (ref 0.7–1.3)
EGFR: 82 ML/MIN/1.73M2
EOSINOPHILS: 1.4 %
ESTRADIOL: 39 PG/ML
FREE TESTOSTERONE: 79.1 PG/ML (ref 35–155)
GLOBULIN: 2.3 G/DL (CALC) (ref 1.9–3.7)
GLUCOSE: 83 MG/DL (ref 65–99)
HDL CHOLESTEROL: 36 MG/DL
HEMATOCRIT: 48.4 % (ref 38.5–50)
HEMOGLOBIN: 16.5 G/DL (ref 13.2–17.1)
LDL-CHOLESTEROL: 98 MG/DL (CALC)
LYMPHOCYTES: 25 %
MCH: 31 PG (ref 27–33)
MCHC: 34.1 G/DL (ref 32–36)
MCV: 91 FL (ref 80–100)
MONOCYTES: 7 %
MPV: 9.6 FL (ref 7.5–12.5)
NEUTROPHILS: 66.2 %
NON-HDL CHOLESTEROL: 126 MG/DL (CALC)
PLATELET COUNT: 202 THOUSAND/UL (ref 140–400)
POTASSIUM: 4.3 MMOL/L (ref 3.5–5.3)
PROTEIN, TOTAL: 6.9 G/DL (ref 6.1–8.1)
RDW: 13.1 % (ref 11–15)
RED BLOOD CELL COUNT: 5.32 MILLION/UL (ref 4.2–5.8)
SODIUM: 143 MMOL/L (ref 135–146)
TESTOSTERONE, TOTAL,$/MS/MS: 510 NG/DL (ref 250–1100)
TRIGLYCERIDES: 181 MG/DL
TSH W/REFLEX TO FT4: 4.4 MIU/L (ref 0.4–4.5)
URIC ACID: 7.1 MG/DL (ref 4–8)
VITAMIN D, 25-OH, TOTAL: 74 NG/ML (ref 30–100)
WHITE BLOOD CELL COUNT: 5.6 THOUSAND/UL (ref 3.8–10.8)

## 2023-02-27 RX ORDER — TESTOSTERONE 20.25 MG/1.25G
GEL TOPICAL
Qty: 75 G | Refills: 0 | Status: SHIPPED | OUTPATIENT
Start: 2023-02-27

## 2023-03-27 NOTE — TELEPHONE ENCOUNTER
A refill request was received for:  Requested Prescriptions     Pending Prescriptions Disp Refills    TESTOSTERONE 1.62 % Transdermal Gel [Pharmacy Med Name: Testosterone Transdermal Gel 1.62 %] 75 g 0     Sig: APPLY TWO PUMPS TO THE SKIN ONCE DAILY AS DIRECTED     Last refill date:  02/27/2023  Qty: 75 g  Last office visit: 02/01/2023   When is follow up due: 05/01/2023     No future appointments.

## 2023-03-28 RX ORDER — TESTOSTERONE 20.25 MG/1.25G
GEL TOPICAL
Qty: 75 G | Refills: 0 | Status: SHIPPED | OUTPATIENT
Start: 2023-03-28

## 2023-05-17 ENCOUNTER — PATIENT MESSAGE (OUTPATIENT)
Dept: INTEGRATIVE MEDICINE | Facility: CLINIC | Age: 53
End: 2023-05-17

## 2023-05-17 DIAGNOSIS — M10.9 GOUT INVOLVING TOE, UNSPECIFIED CAUSE, UNSPECIFIED CHRONICITY, UNSPECIFIED LATERALITY: Primary | ICD-10-CM

## 2023-05-17 DIAGNOSIS — N26.1 RENAL ATROPHY: ICD-10-CM

## 2023-05-17 NOTE — TELEPHONE ENCOUNTER
RN s/w patient. Patient denies having back pain currently. Patient states that he has been \"trying to lamont this nervous system issue for years now\"     Patient has one kidney. Patient had lower back pain last week - patient says that he is in the gym daily and thinks it might be a muscle. Patient denies issues with urination at this time. Patient says he feels \"perfectly\" fine. Patient says that \"something is off in his system and he knows \"     Patient states that he is having a gout flare up on his foot, patient is taking 1 indomethacin 3 times per day. Gout flare up is on his right foot (toe). Patient says that his hips have been stiff for three years now and he has trouble with the stairs. Will send message to  [address] [place] [address] for recommendations. ED precautions given to the patient. Pt instructed to seek care at the IC/ED for sob, severe pain, if \"gout\" worsens, pain with urination, blood in the urine, urine odor, or other urgent s/s.

## 2023-05-17 NOTE — TELEPHONE ENCOUNTER
From: Jg Lantigua  To: John Medrano DO  Sent: 5/17/2023 3:53 PM CDT  Subject: Kidney Referral    Doc M,  Feeling good on Testerone and hitting gym regularly. No issues there. Supplements have had mixed results. L Theanine and possibly Magnesium have made noticeable difference in calming my nervous system a bit. Sleeping reasonably well. But. .. Another big gout outbreak but cannot think of heavy purine intake in my diet. In addition, substantial lower back pain last week. I wanted your input on seeing kidney specialist to take closer look at my one functioning kidney. I know my basic kidney numbers look fine on blood test / urine test, but it seems like I should rule out some of my issues coming from sub-optimal waste removal.     Let me know what you think and if you have a nephrologist recommendation.     Thanks  [employer]

## 2023-05-18 NOTE — TELEPHONE ENCOUNTER
S/w Zoraida Myles      I informed pt Dr. Kathy Abreu has ordered Uric acid, CMP. CBC and sent in referral for Nephrologist Dr. Daria Anna. Pt requesting Dr. Dow General contact information sent via [facility].         Pt voiced understanding and agreed to lab draw and evaluation by Nephrologist.

## 2023-05-18 NOTE — TELEPHONE ENCOUNTER
Uric acid, CMP and CBC ordered. Would like to check kidney function given use of indomethacin. Nephrology may also help guide better therapy during gout flare and given kidneys also help excret uric acid it is worth the consult.

## 2023-05-23 LAB
ABSOLUTE BASOPHILS: 22 CELLS/UL (ref 0–200)
ABSOLUTE EOSINOPHILS: 81 CELLS/UL (ref 15–500)
ABSOLUTE LYMPHOCYTES: 1620 CELLS/UL (ref 850–3900)
ABSOLUTE MONOCYTES: 421 CELLS/UL (ref 200–950)
ABSOLUTE NEUTROPHILS: 3256 CELLS/UL (ref 1500–7800)
ALBUMIN/GLOBULIN RATIO: 1.8 (CALC) (ref 1–2.5)
ALBUMIN: 4.6 G/DL (ref 3.6–5.1)
ALKALINE PHOSPHATASE: 61 U/L (ref 35–144)
ALT: 42 U/L (ref 9–46)
AST: 37 U/L (ref 10–35)
BASOPHILS: 0.4 %
BILIRUBIN, TOTAL: 0.9 MG/DL (ref 0.2–1.2)
BUN/CREATININE RATIO: 23 (CALC) (ref 6–22)
BUN: 27 MG/DL (ref 7–25)
CALCIUM: 9.7 MG/DL (ref 8.6–10.3)
CARBON DIOXIDE: 28 MMOL/L (ref 20–32)
CHLORIDE: 102 MMOL/L (ref 98–110)
CREATININE: 1.19 MG/DL (ref 0.7–1.3)
EGFR: 73 ML/MIN/1.73M2
EOSINOPHILS: 1.5 %
GLOBULIN: 2.6 G/DL (CALC) (ref 1.9–3.7)
GLUCOSE: 95 MG/DL (ref 65–99)
HEMATOCRIT: 49.6 % (ref 38.5–50)
HEMOGLOBIN: 17.3 G/DL (ref 13.2–17.1)
LYMPHOCYTES: 30 %
MCH: 31.7 PG (ref 27–33)
MCHC: 34.9 G/DL (ref 32–36)
MCV: 91 FL (ref 80–100)
MONOCYTES: 7.8 %
MPV: 10 FL (ref 7.5–12.5)
NEUTROPHILS: 60.3 %
PLATELET COUNT: 185 THOUSAND/UL (ref 140–400)
POTASSIUM: 4.2 MMOL/L (ref 3.5–5.3)
PROTEIN, TOTAL: 7.2 G/DL (ref 6.1–8.1)
RDW: 13 % (ref 11–15)
RED BLOOD CELL COUNT: 5.45 MILLION/UL (ref 4.2–5.8)
SODIUM: 139 MMOL/L (ref 135–146)
URIC ACID: 8.6 MG/DL (ref 4–8)
WHITE BLOOD CELL COUNT: 5.4 THOUSAND/UL (ref 3.8–10.8)

## 2023-06-13 ENCOUNTER — TELEPHONE (OUTPATIENT)
Dept: FAMILY MEDICINE CLINIC | Facility: CLINIC | Age: 53
End: 2023-06-13

## 2023-06-19 ENCOUNTER — TELEPHONE (OUTPATIENT)
Dept: FAMILY MEDICINE CLINIC | Facility: CLINIC | Age: 53
End: 2023-06-19

## 2023-06-19 NOTE — TELEPHONE ENCOUNTER
S/w Ramses White      Pt informed that Navjot Whitmore advised he is not managing his gabapentin but will discuss on his next follow-up. Pt stated he would probably take himself off of the gabapentin. Pt informed he should not self discontinue his medication and needs to discuss with the prescriber. Pt voiced understanding.

## 2023-06-22 ENCOUNTER — TELEPHONE (OUTPATIENT)
Dept: INTEGRATIVE MEDICINE | Facility: CLINIC | Age: 53
End: 2023-06-22

## 2023-06-28 RX ORDER — TESTOSTERONE 20.25 MG/1.25G
40.5 GEL TOPICAL DAILY
Qty: 75 G | Refills: 1 | Status: SHIPPED | OUTPATIENT
Start: 2023-06-28

## 2023-07-19 ENCOUNTER — TELEPHONE (OUTPATIENT)
Dept: INTEGRATIVE MEDICINE | Facility: CLINIC | Age: 53
End: 2023-07-19

## 2023-07-19 ENCOUNTER — TELEMEDICINE (OUTPATIENT)
Dept: INTEGRATIVE MEDICINE | Facility: CLINIC | Age: 53
End: 2023-07-19
Payer: COMMERCIAL

## 2023-07-19 DIAGNOSIS — M10.9 GOUT INVOLVING TOE, UNSPECIFIED CAUSE, UNSPECIFIED CHRONICITY, UNSPECIFIED LATERALITY: ICD-10-CM

## 2023-07-19 DIAGNOSIS — R79.89 LOW TESTOSTERONE: ICD-10-CM

## 2023-07-19 DIAGNOSIS — E34.9 TESTOSTERONE INSUFFICIENCY: Primary | ICD-10-CM

## 2023-07-19 DIAGNOSIS — R79.89 ELEVATED HOMOCYSTEINE: ICD-10-CM

## 2023-07-19 PROCEDURE — 99214 OFFICE O/P EST MOD 30 MIN: CPT | Performed by: FAMILY MEDICINE

## 2023-07-19 NOTE — TELEPHONE ENCOUNTER
I called pt to triage him for his visit and he was very rude and states he do not have time for me to question him and these are things I should already know and hang up on me so I could not complete my task.

## 2023-08-03 LAB
ABSOLUTE BASOPHILS: 29 CELLS/UL (ref 0–200)
ABSOLUTE EOSINOPHILS: 59 CELLS/UL (ref 15–500)
ABSOLUTE LYMPHOCYTES: 1450 CELLS/UL (ref 850–3900)
ABSOLUTE MONOCYTES: 387 CELLS/UL (ref 200–950)
ABSOLUTE NEUTROPHILS: 2974 CELLS/UL (ref 1500–7800)
ALBUMIN/GLOBULIN RATIO: 1.8 (CALC) (ref 1–2.5)
ALBUMIN: 4.4 G/DL (ref 3.6–5.1)
ALKALINE PHOSPHATASE: 56 U/L (ref 35–144)
ALT: 29 U/L (ref 9–46)
AST: 24 U/L (ref 10–35)
BASOPHILS: 0.6 %
BILIRUBIN, TOTAL: 0.7 MG/DL (ref 0.2–1.2)
BUN: 22 MG/DL (ref 7–25)
CALCIUM: 9.4 MG/DL (ref 8.6–10.3)
CARBON DIOXIDE: 31 MMOL/L (ref 20–32)
CHLORIDE: 103 MMOL/L (ref 98–110)
CREATININE: 1.16 MG/DL (ref 0.7–1.3)
EGFR: 75 ML/MIN/1.73M2
EOSINOPHILS: 1.2 %
FREE TESTOSTERONE: 114.2 PG/ML (ref 35–155)
GLOBULIN: 2.5 G/DL (CALC) (ref 1.9–3.7)
GLUCOSE: 100 MG/DL (ref 65–99)
HEMATOCRIT: 47.8 % (ref 38.5–50)
HEMOGLOBIN: 16.3 G/DL (ref 13.2–17.1)
HOMOCYSTEINE,$CARDIOVASCULAR: 8.9 UMOL/L
LYMPHOCYTES: 29.6 %
MCH: 31 PG (ref 27–33)
MCHC: 34.1 G/DL (ref 32–36)
MCV: 90.9 FL (ref 80–100)
MONOCYTES: 7.9 %
MPV: 9.7 FL (ref 7.5–12.5)
NEUTROPHILS: 60.7 %
PLATELET COUNT: 170 THOUSAND/UL (ref 140–400)
POTASSIUM: 4.1 MMOL/L (ref 3.5–5.3)
PROTEIN, TOTAL: 6.9 G/DL (ref 6.1–8.1)
PSA, TOTAL: 1.1 NG/ML
RDW: 12.8 % (ref 11–15)
RED BLOOD CELL COUNT: 5.26 MILLION/UL (ref 4.2–5.8)
SODIUM: 139 MMOL/L (ref 135–146)
TESTOSTERONE, TOTAL,$/MS/MS: 512 NG/DL (ref 250–1100)
URIC ACID: 6.6 MG/DL (ref 4–8)
WHITE BLOOD CELL COUNT: 4.9 THOUSAND/UL (ref 3.8–10.8)

## 2023-08-10 ENCOUNTER — NURSE TRIAGE (OUTPATIENT)
Dept: FAMILY MEDICINE CLINIC | Facility: CLINIC | Age: 53
End: 2023-08-10

## 2023-08-10 ENCOUNTER — TELEPHONE (OUTPATIENT)
Dept: INTEGRATIVE MEDICINE | Facility: CLINIC | Age: 53
End: 2023-08-10

## 2023-08-10 RX ORDER — INDOMETHACIN 50 MG/1
CAPSULE ORAL
Qty: 20 CAPSULE | Refills: 0 | Status: CANCELLED
Start: 2023-08-10

## 2023-08-10 NOTE — TELEPHONE ENCOUNTER
S/w Sumi Ledbetter    Pt stated \" My gout symptoms are back and need Rx for Indomethacin 50 mg to preferred pharamcy. Pt does not have any more of this medication. Indomethacin 50 mg pended; authorize if appropriate. Pt informed if have worsen pain, develop a fever or swelling to call the office or go to the . Pt voiced understanding. Reason for Disposition   Ankle pain    Answer Assessment - Initial Assessment Questions  1. ONSET: \"When did the pain start? \"     Today  2. LOCATION: \"Where is the pain located? \"       Right ankle  3. PAIN: \"How bad is the pain? \"    (Scale 1-10; or mild, moderate, severe)   - MILD (1-3): doesn't interfere with normal activities. - MODERATE (4-7): interferes with normal activities (e.g., work or school) or awakens from sleep, limping.    - SEVERE (8-10): excruciating pain, unable to do any normal activities, unable to walk. 3/10  4. WORK OR EXERCISE: \"Has there been any recent work or exercise that involved this part of the body? \"       Denied  5. CAUSE: \"What do you think is causing the ankle pain? \"      Gout exacerbation  6. OTHER SYMPTOMS: \"Do you have any other symptoms? \" (e.g., calf pain, rash, fever, swelling)      Denied  7. PREGNANCY: \"Is there any chance you are pregnant? \" \"When was your last menstrual period? \"      N/A    Protocols used:  Ankle Pain-A-[place]

## 2023-08-10 NOTE — TELEPHONE ENCOUNTER
Patient left message stated he is suffering from a \"gout attack\" requesting medication indomethacin.  He stated that he has not previously prescribed but was discussed at his last visit that he was running low and if could be added to medication list.     Please advise, Thank you

## 2023-08-11 RX ORDER — INDOMETHACIN 50 MG/1
50 CAPSULE ORAL
Qty: 15 CAPSULE | Refills: 2 | Status: SHIPPED | OUTPATIENT
Start: 2023-08-11

## 2023-08-11 NOTE — TELEPHONE ENCOUNTER
Pt has gout exacerbation (refer to 8/10/23 triage). Rx had been ordered by outside provider.     Indomethacin 50 mg, # 90, authorize if appropriate

## 2023-08-11 NOTE — TELEPHONE ENCOUNTER
S/w Shana Blake      Pt informed that Dr. Linsey Heaton if off today so I advised to go to . Pt stated he is feeling better today so will await for Dr. Linsey Heaton to fill this prescription.

## 2023-09-20 ENCOUNTER — OFFICE VISIT (OUTPATIENT)
Dept: NEPHROLOGY | Facility: CLINIC | Age: 53
End: 2023-09-20

## 2023-09-20 VITALS
WEIGHT: 276 LBS | DIASTOLIC BLOOD PRESSURE: 75 MMHG | HEIGHT: 75 IN | SYSTOLIC BLOOD PRESSURE: 127 MMHG | BODY MASS INDEX: 34.32 KG/M2 | HEART RATE: 71 BPM

## 2023-09-20 DIAGNOSIS — Q60.0 CONGENITAL SOLITARY KIDNEY: ICD-10-CM

## 2023-09-20 DIAGNOSIS — M10.9 GOUT, UNSPECIFIED CAUSE, UNSPECIFIED CHRONICITY, UNSPECIFIED SITE: Primary | ICD-10-CM

## 2023-09-20 PROCEDURE — 99245 OFF/OP CONSLTJ NEW/EST HI 55: CPT | Performed by: INTERNAL MEDICINE

## 2023-09-20 RX ORDER — PREDNISONE 10 MG/1
TABLET ORAL
COMMUNITY
Start: 2023-09-14 | End: 2023-09-28

## 2023-09-20 RX ORDER — COLCHICINE 0.6 MG/1
0.6 TABLET ORAL 2 TIMES DAILY
Qty: 30 TABLET | Refills: 0 | Status: SHIPPED | OUTPATIENT
Start: 2023-09-20

## 2023-09-20 RX ORDER — FEBUXOSTAT 80 MG/1
80 TABLET, FILM COATED ORAL DAILY
Qty: 90 TABLET | Refills: 3 | Status: SHIPPED | OUTPATIENT
Start: 2023-09-20 | End: 2023-10-20

## 2023-09-20 NOTE — PATIENT INSTRUCTIONS
Stop allopurinol    Start uloric once day    No more than 140 grams protein in diet day    Drink plenty of fluids    Colchicine twice a day for gout attack watch for problems with your intestines    Please repeat labs in about 6 weeks I will call with results    I am going to order a cortisol level for you in about 6 weeks also    Lets do a video visit after your labs are done    Please schedule that for the first week of November      Nice to meet you Sarah Gama

## 2023-09-21 NOTE — PROGRESS NOTES
Dear Darian Miller Note     Abel Cagle    Is a complicated 63-DGB[age]-OGH male. 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 Urinating fine has various neurological symptoms does have a history of low testosterone is on testosterone replacement is on a lot of vitamins has a history of recurrent gout does follow low purine diet attacks are about 3 times a year recently his last uric acid level was good  6.2 stays hydrated was having paresthesias at night was put on gabapentin 300/day and is doing okay with that had a sleep test which showed mild sleep apnea. Currently had a gout attack recently was taking indomethacin no longer working and prednisone which she is weaning down. Uses testosterone gel daily    Does not smoke or drink does not abuse drugs    Father has COPD mother passed away no kidney disease in the family    Patient rarely takes NSAIDs unless for gout attack    Past surgery as a boy had surgery for urinary reflux    Is frustrated about his scalp pains it is recurrent and different joints red and tender would like to get this stopped not sure if it is related to his kidneys    HISTORY:  Past Medical History:   Diagnosis Date    Hyperuricemia     Lumbosacral radiculopathy at S1     Posterior urethral valves     Renal atrophy       Past Surgical History:   Procedure Laterality Date    LUMBAR TRANSFORAMINAL EPIDURAL INJECTION (EEH)      OTHER      per patient; bladder surgery. urine backflow when age 6-9      Social History    Tobacco Use      Smoking status: Never      Smokeless tobacco: Never    Alcohol use: Yes      Comment: socially; moderate per patient         Medications (Active prior to today's visit):  Current Outpatient Medications   Medication Sig Dispense Refill    predniSONE 10 MG Oral Tab Take by mouth. Febuxostat (ULORIC) 80 MG Oral Tab Take 80 mg by mouth daily.  [address] tablet 3    colchicine 0.6 MG Oral Tab Take 1 tablet (0.6 mg total) by mouth 2 (two) times daily. 30 tablet 0    indomethacin 50 MG Oral Cap Take 1 capsule (50 mg total) by mouth 3 (three) times daily with meals. (Patient taking differently: Take 1 capsule (50 mg total) by mouth 3 (three) times daily with meals. PRN) 15 capsule 2    Testosterone 1.62 % Transdermal Gel Apply 40.5 mg topically daily. 75 g 1    B Complex Vitamins (VITAMIN-B COMPLEX OR) Take by mouth. allopurinol 300 MG Oral Tab Take 1 tablet (300 mg total) by mouth daily.  90 tablet 1    GABAPENTIN 300 MG Oral Cap TAKE 1 CAPSULE(300 MG) BY MOUTH EVERY NIGHT 30 capsule 0       Allergies:    Seasonal                OTHER (SEE COMMENTS)      ROS:     Constitutional:  Negative for decreased activity, fever, irritability and lethargy  ENMT:  Negative for ear drainage, hearing loss and nasal drainage  Eyes:  Negative for eye discharge and vision loss  Cardiovascular:  Negative for chest pain, sob  Respiratory:  Negative for cough, dyspnea and wheezing  Gastrointestinal:  Negative for abdominal pain, constipation  Genitourinary:  Negative for dysuria and hematuria  Endocrine:  Negative for abnormal sleep patterns  Hema/Lymph:  Negative for easy bleeding and easy bruising  Integumentary:  Negative for pruritus and rash  Musculoskeletal:  Negative for bone/joint symptoms  Neurological:  Negative for gait disturbance  Psychiatric:  Negative for inappropriate interaction and psychiatric symptoms       09/20/23  1441   BP: 127/75   Pulse: 71       PHYSICAL EXAM:   Constitutional: appears well hydrated alert and responsive   Head/Face: normocephalic  Eyes/Vision: normal extraocular motion is intact  Nose/Mouth/Throat:mucous membranes are moist   Neck/Thyroid: neck is supple without adenopathy  Lymphatic: no abnormal cervical, supraclavicular adenopathy is noted  Respiratory:  lungs are clear to auscultation bilaterally  Cardiovascular: regular rate and rhythm Abdomen: soft, non-tender, non-distended, BS normal  Skin/Hair: no unusual rashes present, no abnormal bruising noted  Back/Spine: no abnormalities noted  Musculoskeletal: No tophi seen  Extremities: no edema  Neurological:  Grossly normal       ASSESSMENT/PLAN:   Assessment   Gout, unspecified cause, unspecified chronicity, unspecified site  (primary encounter diagnosis)  Congenital solitary kidney    #1 gout instructed on a low purine diet do not have excess protein in his diet no more than 140 g/day which is based on about 1 g/kg of weight he weighs about 125 kilograms    Going to try him on Uloric 80 mg/day rather than allopurinol repeat labs in about 6 weeks  Stay hydrated  I gave him colchicine to take twice a day for gout attack for no more than 3 to 4 days as needed  Last creatinine was 1.1 which is pretty good for solitary kidney keep an eye on blood pressure which is good at 127/75 right now    He is concerned about a low cortisol level he is on prednisone so difficult to check  But will order a cortisol level fasting in the morning in about 6 weeks    We will do a video visit when I get his lab results we will also check an A1c uric acid cortisol level and CMP  We will also do sed rate and C-reactive protein his homocysteine level is negative PSA is fine  CBC and testosterone are fine GFR 75    Thank you very much for this consult of this very interesting patient    Regards,  Tristan Marcano     Orders This Visit:  Orders Placed This Encounter      Comp Metabolic Panel (14)      Hemoglobin A1C      Uric Acid      Cortisol      Meds This Visit:  Requested Prescriptions     Signed Prescriptions Disp Refills    Febuxostat (ULORIC) 80 MG Oral Tab 90 tablet 3     Sig: Take 80 mg by mouth daily. colchicine 0.6 MG Oral Tab 30 tablet 0     Sig: Take 1 tablet (0.6 mg total) by mouth 2 (two) times daily. Imaging & Referrals:  None     9/20/2023  Trip Mcclure MD

## 2023-09-26 ENCOUNTER — PATIENT MESSAGE (OUTPATIENT)
Dept: NEPHROLOGY | Facility: CLINIC | Age: 53
End: 2023-09-26

## 2023-09-27 ENCOUNTER — TELEMEDICINE (OUTPATIENT)
Dept: INTEGRATIVE MEDICINE | Facility: CLINIC | Age: 53
End: 2023-09-27
Payer: COMMERCIAL

## 2023-09-27 DIAGNOSIS — R20.2 PARESTHESIA: ICD-10-CM

## 2023-09-27 DIAGNOSIS — R79.89 LOW TESTOSTERONE: Primary | ICD-10-CM

## 2023-09-27 DIAGNOSIS — M1A.9XX0 CHRONIC GOUT INVOLVING TOE OF LEFT FOOT WITHOUT TOPHUS, UNSPECIFIED CAUSE: ICD-10-CM

## 2023-09-27 PROBLEM — R76.8 POSITIVE ANA (ANTINUCLEAR ANTIBODY): Status: ACTIVE | Noted: 2022-02-14

## 2023-09-27 PROBLEM — M93.20 OSTEOCHONDRITIS DISSECANS: Status: ACTIVE | Noted: 2019-01-02

## 2023-09-27 PROBLEM — M25.579 PAIN IN JOINT, ANKLE AND FOOT: Status: ACTIVE | Noted: 2018-12-10

## 2023-09-27 PROBLEM — R29.898 WEAKNESS OF LEFT LOWER EXTREMITY: Status: ACTIVE | Noted: 2018-10-18

## 2023-09-27 PROBLEM — R52 PAIN: Status: ACTIVE | Noted: 2020-09-10

## 2023-09-27 PROBLEM — M54.2 NECK PAIN: Status: ACTIVE | Noted: 2021-04-20

## 2023-09-27 PROBLEM — M54.16 LUMBAR RADICULOPATHY: Status: ACTIVE | Noted: 2019-04-23

## 2023-09-27 PROBLEM — M54.30 SCIATICA: Status: ACTIVE | Noted: 2019-09-13

## 2023-09-27 PROBLEM — H53.9 VISUAL CHANGES: Status: ACTIVE | Noted: 2018-10-18

## 2023-09-27 PROBLEM — M47.819 SPONDYLOSIS WITHOUT MYELOPATHY: Status: ACTIVE | Noted: 2021-04-20

## 2023-09-27 PROBLEM — G57.02 PIRIFORMIS SYNDROME OF LEFT SIDE: Status: ACTIVE | Noted: 2023-06-12

## 2023-09-27 PROBLEM — M17.12 ARTHROPATHY OF LEFT KNEE: Status: ACTIVE | Noted: 2020-09-10

## 2023-09-27 PROBLEM — M47.812 CERVICAL SPONDYLOSIS WITHOUT MYELOPATHY: Status: ACTIVE | Noted: 2021-04-20

## 2023-09-27 PROCEDURE — 99214 OFFICE O/P EST MOD 30 MIN: CPT | Performed by: FAMILY MEDICINE

## 2023-09-27 NOTE — TELEPHONE ENCOUNTER
From: Virgil Bright  To: Fabio Mcclure  Sent: 9/26/2023 7:38 PM CDT  Subject: Blood Test    Dr. Lavelle Mcclure,  Thanks for meeting with me last week. Hoping you can clarify where to take blood test last week of October. At our meeting you mentioned that everything was ordered for me to take at [facility] AND [facility] testing location. However, I received a notice from [facility] that they received request from you too. Is it the same exam(s)? Can you send cortisol test there too? Thanks in advance.   Danielito Barnard

## 2023-09-29 ENCOUNTER — NURSE TRIAGE (OUTPATIENT)
Dept: FAMILY MEDICINE CLINIC | Facility: CLINIC | Age: 53
End: 2023-09-29

## 2023-09-29 ENCOUNTER — PATIENT MESSAGE (OUTPATIENT)
Dept: INTEGRATIVE MEDICINE | Facility: CLINIC | Age: 53
End: 2023-09-29

## 2023-09-29 NOTE — TELEPHONE ENCOUNTER
Agree with need for in person evaluation. Treatment of ear pain depends on physical exam and appropriate diagnosis.

## 2023-09-29 NOTE — TELEPHONE ENCOUNTER
From: Messi Pereira  To: Arpan Clark [facility]  Sent: 9/29/2023 4:29 AM CDT  Subject: Ear Ache    In [place], [place] visiting my kids. No gout flare at this time. Taking 1 Colch per day. However, i have come down with major ear ache. Otc meds not helping. I do have access to [employer] here. Any chance to send prescription for ear? I can be reached at [phone].     Ross Nicholas

## 2023-09-29 NOTE — TELEPHONE ENCOUNTER
S/w Patt Barriga who is currently in [place] and insisting Dr. Jcarlos Iyer \" send in a prescription\" for his right ear pain. Radiates to right jaw. Pt adamantly declined disposition for UC or walk-in evaluation. Pt stated \" Dr. Sarah Rivera called in a prescription for the exact symptoms. \" Pt believes he has swimmers ear. Pt cautioned it is not advisable to board a plan with potential ear infection. Pt stated \" I will suffer in pain if Dr. Jcarlos Iyer does not prescribe anything. \"    Pt requesting Dr. cJarlos Iyer call in a prescription for his ear pain. Please advise.

## 2023-10-27 DIAGNOSIS — E34.9 TESTOSTERONE INSUFFICIENCY: Primary | ICD-10-CM

## 2023-11-01 ENCOUNTER — PATIENT MESSAGE (OUTPATIENT)
Dept: INTEGRATIVE MEDICINE | Facility: CLINIC | Age: 53
End: 2023-11-01

## 2023-11-01 DIAGNOSIS — M1A.9XX0 CHRONIC GOUT INVOLVING TOE OF LEFT FOOT WITHOUT TOPHUS, UNSPECIFIED CAUSE: Primary | ICD-10-CM

## 2023-11-01 LAB
ALBUMIN/GLOBULIN RATIO: 1.9 (CALC) (ref 1–2.5)
ALBUMIN: 4.5 G/DL (ref 3.6–5.1)
ALKALINE PHOSPHATASE: 55 U/L (ref 35–144)
ALT: 26 U/L (ref 9–46)
AST: 26 U/L (ref 10–35)
BILIRUBIN, TOTAL: 0.8 MG/DL (ref 0.2–1.2)
BUN: 20 MG/DL (ref 7–25)
CALCIUM: 9.6 MG/DL (ref 8.6–10.3)
CARBON DIOXIDE: 32 MMOL/L (ref 20–32)
CHLORIDE: 102 MMOL/L (ref 98–110)
CORTISOL, TOTAL: 14.5 MCG/DL
CREATININE: 1.16 MG/DL (ref 0.7–1.3)
EGFR: 74 ML/MIN/1.73M2
GLOBULIN: 2.4 G/DL (CALC) (ref 1.9–3.7)
GLUCOSE: 83 MG/DL (ref 65–99)
HEMOGLOBIN A1C: 5 % OF TOTAL HGB
POTASSIUM: 3.8 MMOL/L (ref 3.5–5.3)
PROTEIN, TOTAL: 6.9 G/DL (ref 6.1–8.1)
SODIUM: 140 MMOL/L (ref 135–146)
URIC ACID: 6.2 MG/DL (ref 4–8)

## 2023-11-02 ENCOUNTER — TELEPHONE (OUTPATIENT)
Dept: INTEGRATIVE MEDICINE | Facility: CLINIC | Age: 53
End: 2023-11-02

## 2023-11-02 NOTE — TELEPHONE ENCOUNTER
From: Abdias Bose  To: Trevor Tracey [facility]  Sent: 11/1/2023 8:52 AM CDT  Subject: Blood test / Gout follow up    Dr Becky Awad,    Completed blood test yesterday. You should have results shortly. Indira should have reached out to you for testosterone refill. I have finished all of my colcheceine as of yesterday. I either need to take single febuxostat daily or continue with single colcheceine. If you would like me to continue colcheciene, I will need new prescription. I believe uric acid was at 6.2 yesterday. I have follow up with Dr Princess Velasquez next week. However, I think he will likely hand off new gout prescriptons to you.     Pema Hawkins

## 2023-11-02 NOTE — TELEPHONE ENCOUNTER
Pt inquiring if should continue colcheceine ? Per Dr. Ana Maria Tao 9/27/23 note:    \" Start [place] Hiss as directed  May use Colchicine at 0.6 mg daily for PPX will initiating Uloric   Follow up with nephro as directed \"     Prior colcheceine RX written by Dr. Bertin Cabral. Should the pt ask Dr. Katrina Orellana office for  colcheceine refill?

## 2023-11-02 NOTE — TELEPHONE ENCOUNTER
Traci Brown  #[phone]    Following up to see if we have received the testosterone request.    Thank you

## 2023-11-02 NOTE — TELEPHONE ENCOUNTER
A refill request was received for:  Requested Prescriptions     Pending Prescriptions Disp Refills    TESTOSTERONE 1.62 % Transdermal Gel [Pharmacy Med Name: Testosterone Transdermal Gel 1.62 %] 75 g 0     Sig: APPLY 2 PUMPS (40.5 MG) TOPICALLY DAILY AS DIRECTED     Last refill date:  6/28/23  Qty: 75 G, 1 refill  Dx: Low Testosterone  Last office visit: 9/27/23  When is follow up due: 2 months (11/27/23). No future appt    For hormone prescriptions, date of last blood test for rx being requested:7/28/23. Future Appointments   Date Time Provider Cayetano Bojorquez   11/6/2023  7:00 PM Grupo Santoyo MD [id number]  [facility]     Testosterone 1.62% Transdermal Gel pended; authorize if appropriate.

## 2023-11-03 RX ORDER — FEBUXOSTAT 80 MG/1
80 TABLET, FILM COATED ORAL DAILY
Qty: 30 TABLET | Refills: 0 | Status: SHIPPED | OUTPATIENT
Start: 2023-11-03 | End: 2024-02-01

## 2023-11-03 RX ORDER — TESTOSTERONE 20.25 MG/1.25G
40.5 GEL TOPICAL DAILY
Qty: 75 G | Refills: 1 | Status: SHIPPED | OUTPATIENT
Start: 2023-11-03

## 2023-11-03 RX ORDER — COLCHICINE 0.6 MG/1
0.6 TABLET ORAL DAILY
Qty: 30 TABLET | Refills: 2 | Status: SHIPPED | OUTPATIENT
Start: 2023-11-03

## 2023-11-03 NOTE — TELEPHONE ENCOUNTER
Please advise:    Testosterone refilled. Recommended continue use of colchicine for at least 3 months. Refill has been sent. Continue Uloric in addition to colchicine. Goal is to achieve uric acid <6.0 given he has flared at 6.2 in the past.     Repeat uric acid in 4-5 weeks. Order has been placed.

## 2023-11-06 ENCOUNTER — TELEMEDICINE (OUTPATIENT)
Dept: NEPHROLOGY | Facility: CLINIC | Age: 53
End: 2023-11-06

## 2023-11-06 DIAGNOSIS — M25.579 PAIN IN JOINT INVOLVING ANKLE AND FOOT, UNSPECIFIED LATERALITY: Primary | ICD-10-CM

## 2023-11-06 DIAGNOSIS — Q60.0 CONGENITAL SOLITARY KIDNEY: ICD-10-CM

## 2023-11-06 DIAGNOSIS — M10.9 GOUT, UNSPECIFIED CAUSE, UNSPECIFIED CHRONICITY, UNSPECIFIED SITE: ICD-10-CM

## 2023-11-07 NOTE — PROGRESS NOTES
Virtual Telephone Check-In    Pastor Yañez verbally consents to [facility] visit doxiity  Virtual/Telephone Check-In visit on 11/06/23. Patient has been referred to the [facility] website at [email or web address] to review the yearly Consent to Treat document. Patient understands and accepts financial responsibility for any deductible, co-insurance and/or co-pays associated with this service. Duration of the service: 20 mins      Summary of topics discussed:             hTeresa Richardson.  Johann Cole MD

## 2023-12-01 ENCOUNTER — PATIENT MESSAGE (OUTPATIENT)
Dept: NEPHROLOGY | Facility: CLINIC | Age: 53
End: 2023-12-01

## 2023-12-07 LAB — URIC ACID: 6.1 MG/DL (ref 4–8)

## 2024-01-07 ENCOUNTER — WALK IN (OUTPATIENT)
Dept: URGENT CARE | Age: 54
End: 2024-01-07
Attending: FAMILY MEDICINE

## 2024-01-07 VITALS
HEART RATE: 62 BPM | WEIGHT: 275 LBS | OXYGEN SATURATION: 95 % | BODY MASS INDEX: 34.37 KG/M2 | RESPIRATION RATE: 16 BRPM | DIASTOLIC BLOOD PRESSURE: 85 MMHG | SYSTOLIC BLOOD PRESSURE: 138 MMHG

## 2024-01-07 DIAGNOSIS — R06.00 DYSPNEA, UNSPECIFIED TYPE: ICD-10-CM

## 2024-01-07 DIAGNOSIS — R51.9 PRESSURE IN HEAD: ICD-10-CM

## 2024-01-07 DIAGNOSIS — R42 DIZZINESS: Primary | ICD-10-CM

## 2024-01-07 LAB
FLUAV AG UPPER RESP QL IA.RAPID: NEGATIVE
FLUBV AG UPPER RESP QL IA.RAPID: NEGATIVE
GLUCOSE SERPL-MCNC: 100 MG/DL (ref 65–99)
SARS-COV+SARS-COV-2 AG RESP QL IA.RAPID: NOT DETECTED
TEST LOT EXPIRATION DATE: NORMAL
TEST LOT NUMBER: NORMAL

## 2024-01-07 PROCEDURE — 87428 SARSCOV & INF VIR A&B AG IA: CPT | Performed by: FAMILY MEDICINE

## 2024-01-07 PROCEDURE — 82962 GLUCOSE BLOOD TEST: CPT | Performed by: FAMILY MEDICINE

## 2024-01-07 PROCEDURE — 93005 ELECTROCARDIOGRAM TRACING: CPT | Performed by: FAMILY MEDICINE

## 2024-01-07 RX ORDER — CLONIDINE HYDROCHLORIDE 0.1 MG/1
0.1 TABLET ORAL
COMMUNITY
Start: 2024-01-05

## 2024-01-07 RX ORDER — COLCHICINE 0.6 MG/1
0.6 TABLET ORAL DAILY
COMMUNITY
Start: 2023-09-20

## 2024-01-07 RX ORDER — FEBUXOSTAT 80 MG/1
80 TABLET, FILM COATED ORAL DAILY
COMMUNITY
Start: 2023-11-03 | End: 2024-02-01

## 2024-01-07 RX ORDER — LOSARTAN POTASSIUM 25 MG/1
1 TABLET ORAL DAILY
COMMUNITY
Start: 2024-01-05

## 2024-01-07 ASSESSMENT — ENCOUNTER SYMPTOMS
VOICE CHANGE: 0
GASTROINTESTINAL NEGATIVE: 1
PSYCHIATRIC NEGATIVE: 1
RHINORRHEA: 0
TROUBLE SWALLOWING: 0
RESPIRATORY NEGATIVE: 1
SINUS PRESSURE: 0
SINUS PAIN: 0
CONSTITUTIONAL NEGATIVE: 1
NEUROLOGICAL NEGATIVE: 1
HEMATOLOGIC/LYMPHATIC NEGATIVE: 1
SORE THROAT: 0
EYES NEGATIVE: 1

## 2024-01-08 LAB
ATRIAL RATE (BPM): 58
P AXIS (DEGREES): 40
PR-INTERVAL (MSEC): 156
QRS-INTERVAL (MSEC): 98
QT-INTERVAL (MSEC): 448
QTC: 440
R AXIS (DEGREES): 22
REPORT TEXT: NORMAL
T AXIS (DEGREES): 18
VENTRICULAR RATE EKG/MIN (BPM): 58

## 2024-03-31 ENCOUNTER — WALK IN (OUTPATIENT)
Dept: URGENT CARE | Age: 54
End: 2024-03-31

## 2024-03-31 VITALS
HEART RATE: 61 BPM | BODY MASS INDEX: 34.5 KG/M2 | SYSTOLIC BLOOD PRESSURE: 143 MMHG | DIASTOLIC BLOOD PRESSURE: 82 MMHG | OXYGEN SATURATION: 97 % | RESPIRATION RATE: 18 BRPM | WEIGHT: 276 LBS | TEMPERATURE: 97.2 F

## 2024-03-31 DIAGNOSIS — R10.84 GENERALIZED ABDOMINAL PAIN: Primary | ICD-10-CM

## 2024-03-31 LAB
ALBUMIN SERPL-MCNC: 4 G/DL (ref 3.6–5.1)
ALBUMIN/GLOB SERPL: 1.3 {RATIO} (ref 1–2.4)
ALP SERPL-CCNC: 85 UNITS/L (ref 45–117)
ALT SERPL-CCNC: 24 UNITS/L
ANION GAP SERPL CALC-SCNC: 9 MMOL/L (ref 7–19)
AST SERPL-CCNC: 26 UNITS/L
BASOPHILS # BLD: 0 K/MCL (ref 0–0.3)
BASOPHILS NFR BLD: 1 %
BILIRUB SERPL-MCNC: 0.4 MG/DL (ref 0.2–1)
BUN SERPL-MCNC: 17 MG/DL (ref 6–20)
BUN/CREAT SERPL: 16 (ref 7–25)
CALCIUM SERPL-MCNC: 9.6 MG/DL (ref 8.4–10.2)
CHLORIDE SERPL-SCNC: 105 MMOL/L (ref 97–110)
CO2 SERPL-SCNC: 29 MMOL/L (ref 21–32)
CREAT SERPL-MCNC: 1.04 MG/DL (ref 0.67–1.17)
DEPRECATED RDW RBC: 42.1 FL (ref 39–50)
EGFRCR SERPLBLD CKD-EPI 2021: 85 ML/MIN/{1.73_M2}
EOSINOPHIL # BLD: 0.1 K/MCL (ref 0–0.5)
EOSINOPHIL NFR BLD: 2 %
ERYTHROCYTE [DISTWIDTH] IN BLOOD: 12.9 % (ref 11–15)
FASTING DURATION TIME PATIENT: NORMAL H
GLOBULIN SER-MCNC: 3.2 G/DL (ref 2–4)
GLUCOSE SERPL-MCNC: 99 MG/DL (ref 70–99)
HCT VFR BLD CALC: 46 % (ref 39–51)
HGB BLD-MCNC: 15.7 G/DL (ref 13–17)
IMM GRANULOCYTES # BLD AUTO: 0 K/MCL (ref 0–0.2)
IMM GRANULOCYTES # BLD: 1 %
LIPASE SERPL-CCNC: 46 UNITS/L (ref 15–77)
LYMPHOCYTES # BLD: 1.6 K/MCL (ref 1–4)
LYMPHOCYTES NFR BLD: 20 %
MCH RBC QN AUTO: 30.5 PG (ref 26–34)
MCHC RBC AUTO-ENTMCNC: 34.1 G/DL (ref 32–36.5)
MCV RBC AUTO: 89.3 FL (ref 78–100)
MONOCYTES # BLD: 0.6 K/MCL (ref 0.3–0.9)
MONOCYTES NFR BLD: 7 %
NEUTROPHILS # BLD: 5.7 K/MCL (ref 1.8–7.7)
NEUTROPHILS NFR BLD: 69 %
NRBC BLD MANUAL-RTO: 0 /100 WBC
PLATELET # BLD AUTO: 252 K/MCL (ref 140–450)
POTASSIUM SERPL-SCNC: 4.3 MMOL/L (ref 3.4–5.1)
PROT SERPL-MCNC: 7.2 G/DL (ref 6.4–8.2)
RBC # BLD: 5.15 MIL/MCL (ref 4.5–5.9)
SODIUM SERPL-SCNC: 139 MMOL/L (ref 135–145)
WBC # BLD: 8.1 K/MCL (ref 4.2–11)

## 2024-03-31 PROCEDURE — 83690 ASSAY OF LIPASE: CPT | Performed by: CLINICAL MEDICAL LABORATORY

## 2024-03-31 PROCEDURE — 80053 COMPREHEN METABOLIC PANEL: CPT | Performed by: CLINICAL MEDICAL LABORATORY

## 2024-03-31 PROCEDURE — 85025 COMPLETE CBC W/AUTO DIFF WBC: CPT | Performed by: CLINICAL MEDICAL LABORATORY

## 2024-03-31 PROCEDURE — 99214 OFFICE O/P EST MOD 30 MIN: CPT | Performed by: FAMILY MEDICINE

## 2024-03-31 PROCEDURE — 36415 COLL VENOUS BLD VENIPUNCTURE: CPT | Performed by: FAMILY MEDICINE

## 2024-03-31 RX ORDER — AMOXICILLIN 875 MG/1
875 TABLET, COATED ORAL 2 TIMES DAILY
Qty: 20 TABLET | Refills: 0 | Status: SHIPPED | OUTPATIENT
Start: 2024-03-31 | End: 2024-04-10

## 2024-03-31 RX ORDER — PANTOPRAZOLE SODIUM 40 MG/1
40 TABLET, DELAYED RELEASE ORAL DAILY
Qty: 10 TABLET | Refills: 1 | Status: SHIPPED | OUTPATIENT
Start: 2024-03-31

## 2024-03-31 RX ORDER — METRONIDAZOLE 500 MG/1
500 TABLET ORAL 2 TIMES DAILY
Qty: 20 TABLET | Refills: 0 | Status: SHIPPED | OUTPATIENT
Start: 2024-03-31 | End: 2024-04-10

## 2024-03-31 ASSESSMENT — ENCOUNTER SYMPTOMS
EYE PAIN: 0
VOICE CHANGE: 0
CHEST TIGHTNESS: 0
SPEECH DIFFICULTY: 0
ADENOPATHY: 0
EYE DISCHARGE: 0
STRIDOR: 0
POLYDIPSIA: 0
FEVER: 0
ABDOMINAL PAIN: 1
DIZZINESS: 0
WOUND: 0
COUGH: 0
SORE THROAT: 0
POLYPHAGIA: 0
FATIGUE: 0
VOMITING: 0
WHEEZING: 0
BRUISES/BLEEDS EASILY: 0
BACK PAIN: 0
SINUS PRESSURE: 0
WEAKNESS: 0
NAUSEA: 0
DIARRHEA: 0
AGITATION: 0
SINUS PAIN: 0
COLOR CHANGE: 0
NERVOUS/ANXIOUS: 0
CONSTIPATION: 0
BLOOD IN STOOL: 0
HEADACHES: 0
SHORTNESS OF BREATH: 0
EYE REDNESS: 0

## 2024-03-31 ASSESSMENT — PAIN SCALES - GENERAL: PAINLEVEL: 4

## (undated) NOTE — Clinical Note
Please mail labs. Also sent to [facility] [employer]. Advise patient to reach out to us when he has them drawn.

## (undated) NOTE — Clinical Note
Please mail cjc/ipamorelin info, gi effects 3 day profile, and labs. Please fax rest of [facility] to [facility] that he will be using.